# Patient Record
Sex: FEMALE | Race: BLACK OR AFRICAN AMERICAN | Employment: FULL TIME | URBAN - METROPOLITAN AREA
[De-identification: names, ages, dates, MRNs, and addresses within clinical notes are randomized per-mention and may not be internally consistent; named-entity substitution may affect disease eponyms.]

---
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
## 2023-04-08 ENCOUNTER — HOSPITAL ENCOUNTER (OUTPATIENT)
Age: 18
End: 2023-04-08
Attending: EMERGENCY MEDICINE

## 2024-06-10 ENCOUNTER — HOSPITAL ENCOUNTER (EMERGENCY)
Facility: HOSPITAL | Age: 19
Discharge: HOME | End: 2024-06-10

## 2024-06-10 VITALS
HEIGHT: 71 IN | RESPIRATION RATE: 18 BRPM | DIASTOLIC BLOOD PRESSURE: 87 MMHG | TEMPERATURE: 97.7 F | BODY MASS INDEX: 21 KG/M2 | OXYGEN SATURATION: 97 % | SYSTOLIC BLOOD PRESSURE: 134 MMHG | HEART RATE: 81 BPM | WEIGHT: 150 LBS

## 2024-06-10 DIAGNOSIS — Z11.3 SCREEN FOR STD (SEXUALLY TRANSMITTED DISEASE): Primary | ICD-10-CM

## 2024-06-10 DIAGNOSIS — N76.0 BACTERIAL VAGINOSIS: ICD-10-CM

## 2024-06-10 DIAGNOSIS — B96.89 BACTERIAL VAGINOSIS: ICD-10-CM

## 2024-06-10 LAB
APPEARANCE UR: ABNORMAL
BACTERIA URNS QL MICRO: ABNORMAL /HPF
BILIRUB UR QL: NEGATIVE
CLUE CELLS VAG QL WET PREP: ABNORMAL
COLOR UR: ABNORMAL
EPITH CASTS URNS QL MICRO: ABNORMAL /LPF
GLUCOSE UR STRIP.AUTO-MCNC: NEGATIVE MG/DL
HCG UR QL: NEGATIVE
HGB UR QL STRIP: NEGATIVE
KETONES UR QL STRIP.AUTO: NEGATIVE MG/DL
LEUKOCYTE ESTERASE UR QL STRIP.AUTO: ABNORMAL
NITRITE UR QL STRIP.AUTO: NEGATIVE
PH UR STRIP: 6 (ref 5–8)
PROT UR STRIP-MCNC: NEGATIVE MG/DL
RBC #/AREA URNS HPF: ABNORMAL /HPF (ref 0–5)
SP GR UR REFRACTOMETRY: 1.01
T VAGINALIS VAG QL WET PREP: ABNORMAL
URINE CULTURE IF INDICATED: ABNORMAL
UROBILINOGEN UR QL STRIP.AUTO: 1 EU/DL (ref 0.2–1)
WBC URNS QL MICRO: ABNORMAL /HPF (ref 0–4)
YEAST: ABNORMAL

## 2024-06-10 PROCEDURE — 96372 THER/PROPH/DIAG INJ SC/IM: CPT

## 2024-06-10 PROCEDURE — 2500000003 HC RX 250 WO HCPCS

## 2024-06-10 PROCEDURE — 81025 URINE PREGNANCY TEST: CPT

## 2024-06-10 PROCEDURE — 6360000002 HC RX W HCPCS

## 2024-06-10 PROCEDURE — 99284 EMERGENCY DEPT VISIT MOD MDM: CPT

## 2024-06-10 PROCEDURE — 6370000000 HC RX 637 (ALT 250 FOR IP)

## 2024-06-10 PROCEDURE — 87491 CHLMYD TRACH DNA AMP PROBE: CPT

## 2024-06-10 PROCEDURE — 87591 N.GONORRHOEAE DNA AMP PROB: CPT

## 2024-06-10 PROCEDURE — 81001 URINALYSIS AUTO W/SCOPE: CPT

## 2024-06-10 PROCEDURE — 87210 SMEAR WET MOUNT SALINE/INK: CPT

## 2024-06-10 RX ORDER — METRONIDAZOLE 500 MG/1
500 TABLET ORAL 2 TIMES DAILY
Qty: 14 TABLET | Refills: 0 | Status: SHIPPED | OUTPATIENT
Start: 2024-06-11 | End: 2024-06-18

## 2024-06-10 RX ORDER — METRONIDAZOLE 500 MG/1
500 TABLET ORAL
Status: COMPLETED | OUTPATIENT
Start: 2024-06-10 | End: 2024-06-10

## 2024-06-10 RX ORDER — AZITHROMYCIN 500 MG/1
1000 TABLET, FILM COATED ORAL
Status: COMPLETED | OUTPATIENT
Start: 2024-06-10 | End: 2024-06-10

## 2024-06-10 RX ADMIN — AZITHROMYCIN 1000 MG: 500 TABLET, FILM COATED ORAL at 20:44

## 2024-06-10 RX ADMIN — METRONIDAZOLE 500 MG: 500 TABLET ORAL at 20:44

## 2024-06-10 RX ADMIN — LIDOCAINE HYDROCHLORIDE 500 MG: 10 INJECTION, SOLUTION EPIDURAL; INFILTRATION; INTRACAUDAL; PERINEURAL at 20:45

## 2024-06-10 ASSESSMENT — LIFESTYLE VARIABLES
HOW MANY STANDARD DRINKS CONTAINING ALCOHOL DO YOU HAVE ON A TYPICAL DAY: 3 OR 4
HOW OFTEN DO YOU HAVE A DRINK CONTAINING ALCOHOL: MONTHLY OR LESS

## 2024-06-10 ASSESSMENT — PAIN SCALES - GENERAL: PAINLEVEL_OUTOF10: 0

## 2024-06-10 ASSESSMENT — PAIN - FUNCTIONAL ASSESSMENT: PAIN_FUNCTIONAL_ASSESSMENT: 0-10

## 2024-06-10 NOTE — ED TRIAGE NOTES
Patient states she found her boyfriend was sleeping with other people so she wants to make sure she does not have STD. Patient is not complaining of any symptoms or vaginal discharge. States boyfriend did not report to have any STDs.

## 2024-06-10 NOTE — ED NOTES
Pt presents to ED complaining of exposure to STD and wanted to be tested. Pt states she's having regular vaginal whitish discharge, but no vaginal irritation or odor. Pt denies fever and pain upon urination. Pt is alert and oriented x 4, RR even and unlabored, skin is warm and dry. Assesment completed and pt updated on plan of care.       Emergency Department Nursing Plan of Care       The Nursing Plan of Care is developed from the Nursing assessment and Emergency Department Attending provider initial evaluation.  The plan of care may be reviewed in the “ED Provider note”.    The Plan of Care was developed with the following considerations:   Presenting ambulatory assessment: Ambulating at baseline  Patient / Family readiness to learn indicated by: verbalized understanding  Persons(s) to be included in education: patient   Barriers to Learning/Limitations: None    Signed     JULIET GALINDO RN    6/10/2024   7:50 PM

## 2024-06-10 NOTE — ED PROVIDER NOTES
[facility] EMERGENCY DEPT  EMERGENCY DEPARTMENT ENCOUNTER       Pt Name: Sylvia Berrios  MRN: [id number]  Birthdate 2005  Date of evaluation: 6/10/2024  Provider: Jana Rutledge PA-C   PCP: No primary care provider on file.  Note Started: 7:56 PM EDT 6/10/24     CHIEF COMPLAINT       Chief Complaint   Patient presents with    Exposure to STD     HISTORY OF PRESENT ILLNESS: 1 or more elements      History From: Patient  HPI Limitations: None     Sylvia Berrios is a 18 y.o. female who presents to the ED with complaints of exposure to STD.  Patient states she found out her boyfriend recently cheated and endorses strong odor of urine and wants STD exam.  Denies any birth control, reports only 1 partner.    Reviewed PMH, PSH, medications, allergies with patient.      Nursing Notes were all reviewed and agreed with or any disagreements were addressed in the HPI.     REVIEW OF SYSTEMS      Review of Systems     Positives and Pertinent negatives as per HPI.    PAST HISTORY     Past Medical History:  Past Medical History:   Diagnosis Date    Sickle cell trait syndrome (HCC)        Past Surgical History:  History reviewed. No pertinent surgical history.    Family History:  History reviewed. No pertinent family history.    Social History:  Social History     Tobacco Use    Smoking status: Never    Smokeless tobacco: Never   Substance Use Topics    Alcohol use: No    Drug use: No       Allergies:  No Known Allergies    CURRENT MEDICATIONS      Discharge Medication List as of 6/10/2024  9:23 PM        CONTINUE these medications which have NOT CHANGED    Details   docusate (COLACE, DULCOLAX) 100 MG CAPS Take by mouth 2 times daily as neededHistorical Med      ibuprofen (ADVIL;MOTRIN) 600 MG tablet Take by mouth every 6 hours as neededHistorical Med           PHYSICAL EXAM      ED Triage Vitals [06/10/24 1943]   Enc Vitals Group      /87      Pulse 81      Respirations 18      Temp 97.7 °F (36.5 °C)      Temp Source  chlamydia and trichomoniasis.  Patient tested positive for clue cells on wet prep which is indicative of bacterial vaginosis.  Discussed following up with OB/GYN in the next week for reevaluation.  Avoid sexual encounters with any partners in neck 7 days and notify any partners of any positive test results they may be tested and treated as well.  Also advised patient to not drink with metronidazole as this can cause nausea and vomiting. Reviewed treatment plan and management with patient.  Educated patient on medications, side effects, discussed strict return precautions.  Recommend follow-up with PCP within the next week for further evaluation and management. Shared decision making utilized, pt expressed understanding to all the above, agreed to tx and f/u as recommended, all questions answered.     FINAL IMPRESSION     1. Screen for STD (sexually transmitted disease)    2. Bacterial vaginosis      DISPOSITION/PLAN   DISPOSITION Decision To Discharge 06/10/2024 09:23:21 PM    Discharge Note: The patient is stable for discharge home. The signs, symptoms, diagnosis, and discharge instructions have been discussed, understanding conveyed, and agreed upon. The patient is to follow up as recommended or return to ER should their symptoms worsen.      PATIENT REFERRED TO:  [facility] EMERGENCY DEPT  [address] [place] [address]  [phone]    As needed, If symptoms worsen    [facility]  [address] [place] [address]  [phone]  Schedule an appointment as soon as possible for a visit in 1 week  To establish care or follow-up with your PCP for reevaluation and repeat testing as needed.       DISCHARGE MEDICATIONS:     Medication List        START taking these medications      metroNIDAZOLE 500 MG tablet  Commonly known as: FLAGYL  Take 1 tablet by mouth 2 times daily for 7 days  Start taking on: June 11, 2024            ASK your doctor about these medications      docusate 100

## 2024-06-11 NOTE — ED NOTES
Discharge instructions were given to the patient by JULIET GALINDO RN.     The patient left the Emergency Department alert and oriented and in no acute distress with 1 prescriptions. The patient was encouraged to call or return to the ED for worsening issues or problems and was encouraged to schedule a follow up appointment for continuing care.     Ambulation assessment completed before discharge.  Pt left Emergency Department ambulating at baseline with no ortho devices  Ortho device education: none    The patient verbalized understanding of discharge instructions and prescriptions, all questions were answered. The patient has no further concerns at this time.

## 2024-06-11 NOTE — DISCHARGE INSTRUCTIONS
You are seen today for screening exam for STDs.  We treated you empirically in the ED with 1 g azithromycin to cover you for chlamydia and 500 mg ceftriaxone injection for gonorrhea.  Your wet prep revealed positive clue cells which is indicative of bacterial vaginosis.  Therefore I started you on 500 mg metronidazole in the ED tonight.  You must continue this course for the next 7 days taking it twice a day with food.  Please avoid alcohol as this causes severe nausea and vomiting with this medication.  Please abstain from sex for the next 7 to 10 days or until you get negative test results.  Please inform any sexual partners of any positive results they may get tested and treated.    Please follow-up with the local health department or other women's health facilities and free clinics listed below for further testing.  Please check your MyChart in the next 1 to 2 days for results regarding syphilis, positive or negative gonorrhea or chlamydia.    Concerning signs/symptoms may include but are not limited to chest pain, shortness of breath, new fever/chills/sweats, new back pain, persistent nausea/vomiting, new or worsening abdominal pain, palpitations.     Thank You!    It was a pleasure taking care of you in our Emergency Department today. We know that when you come to our Emergency Department, you are entrusting us with your health, comfort, and safety. Our physicians and nurses honor that trust, and truly appreciate the opportunity to care for you and your loved ones.      We also value your feedback. If you receive a survey about your Emergency Department experience today, please fill it out.  We care about our patients' feedback, and we listen to what you have to say.  Thank you.    Jana Rutledge PA-C  ________________________________________________________________________  I have included a copy of your lab results and/or radiologic studies from today's visit so you can have them easily available at your  [place] [address] [phone], ext [phone]     [place] [address] [phone]     [place] [address] [facility] [phone]   [facility] [address] [facility] [phone]   [employer] [facility]  [address] [phone]   [email or web address] ([email or web address]) [phone]         Sexual Health/Woman Wellness Clinics    For STD/HIV testing and treatment, pregnancy testing and services, men's health, birth control services, LGBT services, and hepatitis/HPV vaccine services.   [facility] [address] [phone]   [facility] [address] [facility] [phone]   [facility] [address] [facility] [phone]   [facility] [address], 5th floor [phone]   [facility] [address] [phone]   [facility] [address] [phone]         Specialty Service Clinics     [facility] [phone]   [facility]   [phone]   [facility]   [phone]   Women, Infant and Children's Services: [facility] of [facility] [phone]       [facility] of [facility] [phone]   [facility]   [phone]   [facility]   [phone]   [facility]     [phone]   [facility]   [phone]   [facility] [phone]

## 2024-06-13 LAB
C TRACH DNA SPEC QL NAA+PROBE: POSITIVE
N GONORRHOEA DNA SPEC QL NAA+PROBE: NEGATIVE
SAMPLE TYPE: ABNORMAL
SERVICE CMNT-IMP: ABNORMAL
SPECIMEN SOURCE: ABNORMAL

## 2025-04-12 ENCOUNTER — HOSPITAL ENCOUNTER (EMERGENCY)
Facility: HOSPITAL | Age: 20
Discharge: HOME | End: 2025-04-12
Payer: MEDICAID

## 2025-04-12 VITALS
OXYGEN SATURATION: 98 % | HEART RATE: 85 BPM | SYSTOLIC BLOOD PRESSURE: 117 MMHG | DIASTOLIC BLOOD PRESSURE: 83 MMHG | TEMPERATURE: 98.2 F | RESPIRATION RATE: 16 BRPM | WEIGHT: 150 LBS | BODY MASS INDEX: 21 KG/M2 | HEIGHT: 71 IN

## 2025-04-12 DIAGNOSIS — S80.862A INFECTED INSECT BITE OF LEFT LOWER EXTREMITY, INITIAL ENCOUNTER: ICD-10-CM

## 2025-04-12 DIAGNOSIS — L03.116 CELLULITIS OF LEFT LOWER EXTREMITY: Primary | ICD-10-CM
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
## 2025-04-12 DIAGNOSIS — L08.9 INFECTED INSECT BITE OF LEFT LOWER EXTREMITY, INITIAL ENCOUNTER: ICD-10-CM

## 2025-04-12 PROCEDURE — 99283 EMERGENCY DEPT VISIT LOW MDM: CPT

## 2025-04-12 RX ORDER — CEPHALEXIN 500 MG/1
500 CAPSULE ORAL 4 TIMES DAILY
Qty: 28 CAPSULE | Refills: 0 | Status: SHIPPED | OUTPATIENT
Start: 2025-04-12 | End: 2025-04-19

## 2025-04-12 RX ORDER — IBUPROFEN 600 MG/1
600 TABLET, FILM COATED ORAL EVERY 6 HOURS PRN
Qty: 20 TABLET | Refills: 0 | Status: SHIPPED | OUTPATIENT
Start: 2025-04-12

## 2025-04-12 ASSESSMENT — PAIN DESCRIPTION - LOCATION: LOCATION: LEG

## 2025-04-12 ASSESSMENT — PAIN - FUNCTIONAL ASSESSMENT: PAIN_FUNCTIONAL_ASSESSMENT: 0-10

## 2025-04-12 ASSESSMENT — PAIN SCALES - GENERAL: PAINLEVEL_OUTOF10: 7

## 2025-04-12 ASSESSMENT — PAIN DESCRIPTION - ORIENTATION: ORIENTATION: LEFT

## 2025-04-13 ENCOUNTER — HOSPITAL ENCOUNTER (EMERGENCY)
Facility: HOSPITAL | Age: 20
Discharge: HOME | End: 2025-04-13
Payer: MEDICAID

## 2025-04-13 VITALS
TEMPERATURE: 97.5 F | HEART RATE: 86 BPM | SYSTOLIC BLOOD PRESSURE: 117 MMHG | RESPIRATION RATE: 19 BRPM | DIASTOLIC BLOOD PRESSURE: 76 MMHG | OXYGEN SATURATION: 95 %

## 2025-04-13 DIAGNOSIS — L03.90 CELLULITIS, UNSPECIFIED CELLULITIS SITE: Primary | ICD-10-CM

## 2025-04-13 PROCEDURE — 99283 EMERGENCY DEPT VISIT LOW MDM: CPT

## 2025-04-13 PROCEDURE — 6370000000 HC RX 637 (ALT 250 FOR IP): Performed by: PHYSICIAN ASSISTANT

## 2025-04-13 RX ORDER — CEPHALEXIN 500 MG/1
500 CAPSULE ORAL ONCE
Status: COMPLETED | OUTPATIENT
Start: 2025-04-13 | End: 2025-04-13

## 2025-04-13 RX ORDER — CLINDAMYCIN PHOSPHATE 10 UG/ML
LOTION TOPICAL
Qty: 60 G | Refills: 2 | Status: SHIPPED | OUTPATIENT
Start: 2025-04-13 | End: 2025-05-13

## 2025-04-13 RX ORDER — HYDROCODONE BITARTRATE AND ACETAMINOPHEN 5; 325 MG/1; MG/1
1 TABLET ORAL
Refills: 0 | Status: COMPLETED | OUTPATIENT
Start: 2025-04-13 | End: 2025-04-13

## 2025-04-13 RX ADMIN — HYDROCODONE BITARTRATE AND ACETAMINOPHEN 1 TABLET: 5; 325 TABLET ORAL at 12:27

## 2025-04-13 RX ADMIN — CEPHALEXIN 500 MG: 500 CAPSULE ORAL at 12:27

## 2025-04-13 ASSESSMENT — PAIN SCALES - GENERAL
PAINLEVEL_OUTOF10: 10
PAINLEVEL_OUTOF10: 10

## 2025-04-13 ASSESSMENT — PAIN DESCRIPTION - LOCATION
LOCATION: LEG
LOCATION: LEG

## 2025-04-13 ASSESSMENT — PAIN DESCRIPTION - ORIENTATION
ORIENTATION: LEFT
ORIENTATION: RIGHT;LOWER

## 2025-04-13 ASSESSMENT — PAIN DESCRIPTION - DESCRIPTORS
DESCRIPTORS: THROBBING;DISCOMFORT
DESCRIPTORS: THROBBING

## 2025-04-13 ASSESSMENT — PAIN - FUNCTIONAL ASSESSMENT: PAIN_FUNCTIONAL_ASSESSMENT: 0-10

## 2025-04-13 ASSESSMENT — PAIN DESCRIPTION - PAIN TYPE: TYPE: ACUTE PAIN

## 2025-04-13 NOTE — DISCHARGE INSTRUCTIONS
Antibiotics as previously prescribed   Additional topical antibiotic as prescribed   Return precautions as we discussed       Thank You!    It was a pleasure taking care of you in our Emergency Department today. We know that when you come to [facility], you are entrusting us with your health, comfort, and safety. Our clinicians honor that trust, and truly appreciate the opportunity to care for you and your loved ones.    If you receive a survey about your Emergency Department experience today, please fill it out.  We value your feedback. Thank you.      Ree Mcmanus PA-C    ___________________________________  I have included a copy of your lab results and/or radiologic studies from today's visit so you can have them easily available at your follow-up visit.   No results found for this or any previous visit (from the past 12 hours).    No orders to display     [unfilled]

## 2025-04-13 NOTE — ED TRIAGE NOTES
Pt. Presents with concerns for a spider bite to her left lower leg. Pt. Has raised area with swelling and no drainage. Pt. Denies fevers and chills, denies all other medical concerns at this time, VSS

## 2025-04-13 NOTE — ED PROVIDER NOTES
[facility] EMERGENCY DEPARTMENT  EMERGENCY DEPARTMENT ENCOUNTER         Pt Name: Sylvia Berrios  MRN: [id number]  Birthdate 2005  Date of evaluation: 4/12/2025  Provider: Thierno Arellano PA-C   PCP: No primary care provider on file.  Note Started: 12:00 AM EDT 4/13/25     CHIEF COMPLAINT       Chief Complaint   Patient presents with    Insect Bite        HISTORY OF PRESENT ILLNESS: 1 or more elements      History From: Patient  HPI Limitations: None  Arrival Mode: Private vehicle     Sylvia Berrios is a 19 y.o. female who presents to the ED complaining of possible spider bite to the left lower leg.  She states she noticed the area yesterday and has had swelling and pain associated with it.  She has not had any fevers or chills.  She rates discomfort 7 out of 10 and pain is exacerbated with palpation     Nursing Notes were all reviewed and agreed with or any disagreements were addressed in the HPI.  Please see MDM for additional details of HPI and ROS     REVIEW OF SYSTEMS      Review of Systems     Positives and Pertinent negatives as per HPI.    PAST HISTORY     Past Medical History:  Past Medical History:   Diagnosis Date    Sickle cell trait syndrome        Past Surgical History:  History reviewed. No pertinent surgical history.    Family History:  History reviewed. No pertinent family history.    Social History:  Social History     Tobacco Use    Smoking status: Never    Smokeless tobacco: Never   Substance Use Topics    Alcohol use: No    Drug use: No       Allergies:  No Known Allergies    CURRENT MEDICATIONS      Discharge Medication List as of 4/12/2025 10:17 PM        CONTINUE these medications which have NOT CHANGED    Details   docusate (COLACE, DULCOLAX) 100 MG CAPS Take by mouth 2 times daily as neededHistorical Med             PHYSICAL EXAM      ED Triage Vitals [04/12/25 2132]   Encounter Vitals Group      /83      Systolic BP Percentile       Diastolic BP Percentile

## 2025-04-13 NOTE — ED PROVIDER NOTES
There is no fluctuance but given one local central pustule, feel appropriate to also provide topical clindamycin, there is no appreciable abscess to drain at this time.  Low suspicion of deep space infection, necrotizing fasciitis, osteomyelitis, or other emergent conditions requiring further evaluation or management acutely at this time    Shared decision making performed and care plan created together, patient is happy with this plan, she would just like a dose of these medications at this time.  Will provide  Verbal return precautions advised.   Patient is happy with this plan and verbalizes understanding and agreement.                   FINAL IMPRESSION     1. Cellulitis, unspecified cellulitis site          DISPOSITION/PLAN   DISPOSITION Decision To Discharge 04/13/2025 12:14:13 PM   DISPOSITION CONDITION Stable           Discharge Note: The patient is stable for discharge home. The signs, symptoms, diagnosis, and discharge instructions have been discussed, understanding conveyed, and agreed upon. The patient is to follow up as recommended or return to ER should their symptoms worsen.      PATIENT REFERRED TO:  [facility] Emergency Department  [address] [place] [address]  [phone]    As needed, If symptoms worsen    your primary care provider  follow-up with your PCP for re-evaluation and follow-up from todays ED visit  Schedule an appointment as soon as possible for a visit           DISCHARGE MEDICATIONS:     Medication List        START taking these medications      clindamycin 1 % lotion  Commonly known as: Cleocin-T  Apply thin layer topically 2 times daily.            ASK your doctor about these medications      cephALEXin 500 MG capsule  Commonly known as: KEFLEX  Take 1 capsule by mouth 4 times daily for 7 days     docusate 100 MG Caps  Commonly known as: COLACE, DULCOLAX     ibuprofen 600 MG tablet  Commonly known as: ADVIL;MOTRIN  Take 1 tablet by mouth every 6 hours as needed

## 2025-04-13 NOTE — DISCHARGE INSTRUCTIONS
Make sure you do warm compresses to the affected area at least 3 times a day for 15 minutes at a time.  If redness starts to spread up or down the leg rapidly, there is worsening swelling or fevers arise please return to the ED for further evaluation.  Make sure you finish antibiotics until they are all gone.    It was a pleasure taking care of you at [facility] today.      We know that when you come to [facility], you are entrusting us with your health, comfort, and safety.  Our physicians and nurses honor that trust, and we appreciate the opportunity to care for you and your loved ones.  We also value your feedback, and we would like to hear from you.    When you receive a  >>> survey <<< about your Emergency Department experience today.   Please fill it out and consider giving us all 5's if you had a good experience. We review every single response from our patients. Thank you!

## 2025-04-13 NOTE — ED NOTES
Pt presents ambulatory to ED complaining of an insect bite that happened about 3 days ago and it itching red and swollen. Pt is alert and oriented x 4, RR even and unlabored, skin is warm and dry. Assesment completed and pt updated on plan of care.       Emergency Department Nursing Plan of Care       The Nursing Plan of Care is developed from the Nursing assessment and Emergency Department Attending provider initial evaluation.  The plan of care may be reviewed in the “ED Provider note”.    The Plan of Care was developed with the following considerations:   Patient / Family readiness to learn indicated by:verbalized understanding  Persons(s) to be included in education: patient  Barriers to Learning/Limitations:None    Signed     Ariel Hammonds RN    4/13/2025   12:15 PM

## 2025-04-13 NOTE — ED NOTES
Discharge instructions were given to the patient by JARAD George.     The patient left the Emergency Department alert and oriented and in no acute distress with 1 prescriptions. The patient was encouraged to call or return to the ED for worsening issues or problems and was encouraged to schedule a follow up appointment for continuing care.     Ambulation assessment completed before discharge.  Pt left Emergency Department at expected ambulatory status with no ortho devices  Ortho device education: none    The patient verbalized understanding of discharge instructions and prescriptions, all questions were answered. The patient has no further concerns at this time.

## 2025-04-13 NOTE — ED NOTES
Pt was marked for discharge however the patient had left the department before being given her discharge instructions with prescription information.